# Patient Record
Sex: MALE | Race: WHITE | NOT HISPANIC OR LATINO | Employment: OTHER | URBAN - METROPOLITAN AREA
[De-identification: names, ages, dates, MRNs, and addresses within clinical notes are randomized per-mention and may not be internally consistent; named-entity substitution may affect disease eponyms.]

---

## 2017-01-19 NOTE — PROGRESS NOTES
Pt denied. Chart review after 30 day letter. Pt cancelled urology/cysto/suds and failed to reschedule or respond to 30 day letter. Pt will need urology clearance before re-referral.

## 2017-02-02 ENCOUNTER — TELEPHONE (OUTPATIENT)
Dept: TRANSPLANT | Facility: CLINIC | Age: 42
End: 2017-02-02

## 2017-02-02 NOTE — TELEPHONE ENCOUNTER
----- Message from Mazin Mcclellan sent at 1/31/2017  2:38 PM CST -----  Contact: pt  Is there for You  ----- Message -----     From: Renzo Alan     Sent: 1/31/2017   8:32 AM       To: [facility] Pre-Kidney Transplant Non-Clinical    Calling to schedule hep A and urology in [place], please call

## 2017-02-02 NOTE — TELEPHONE ENCOUNTER
"I spoke with patient and advised him that his episode was closed due to non response to 30 day letter. He stated he never got the 30 day letter. I advised him that even so the transplant process has to move in a timely fashion. I also advised him that he could be re-referred by his nephrologist and his work up could be resumed once he is approved to move through the process. He stated "so you are going to blame me, well I will call my nephrologist and go to [facility]" and he hung up.  "

## 2017-02-16 PROBLEM — R19.5 HEME POSITIVE STOOL: Status: ACTIVE | Noted: 2017-02-16

## 2017-02-20 PROBLEM — D75.1 POLYCYTHEMIA: Status: ACTIVE | Noted: 2017-02-20

## 2017-02-20 PROBLEM — R59.0 LYMPHADENOPATHY, AXILLARY: Status: ACTIVE | Noted: 2017-02-20

## 2018-01-10 PROBLEM — I25.10 CAD (CORONARY ARTERY DISEASE): Status: ACTIVE | Noted: 2018-01-10

## 2018-01-10 PROBLEM — R94.39 ABNORMAL MYOCARDIAL PERFUSION STUDY: Status: ACTIVE | Noted: 2018-01-10

## 2019-09-05 ENCOUNTER — OFFICE VISIT (OUTPATIENT)
Dept: URGENT CARE | Facility: CLINIC | Age: 44
End: 2019-09-05
Payer: MEDICARE

## 2019-09-05 VITALS
HEIGHT: 71 IN | DIASTOLIC BLOOD PRESSURE: 88 MMHG | SYSTOLIC BLOOD PRESSURE: 125 MMHG | TEMPERATURE: 97 F | WEIGHT: 242 LBS | HEART RATE: 91 BPM | BODY MASS INDEX: 33.88 KG/M2 | OXYGEN SATURATION: 97 %

## 2019-09-05 DIAGNOSIS — J02.9 ACUTE PHARYNGITIS, UNSPECIFIED ETIOLOGY: Primary | ICD-10-CM

## 2019-09-05 DIAGNOSIS — N39.0 URINARY TRACT INFECTION ASSOCIATED WITH CATHETERIZATION OF URINARY TRACT, UNSPECIFIED INDWELLING URINARY CATHETER TYPE, INITIAL ENCOUNTER: ICD-10-CM

## 2019-09-05 DIAGNOSIS — T83.511A URINARY TRACT INFECTION ASSOCIATED WITH CATHETERIZATION OF URINARY TRACT, UNSPECIFIED INDWELLING URINARY CATHETER TYPE, INITIAL ENCOUNTER: ICD-10-CM

## 2019-09-05 LAB
BILIRUB UR QL STRIP: NEGATIVE
GLUCOSE UR QL STRIP: NEGATIVE
KETONES UR QL STRIP: NEGATIVE
LEUKOCYTE ESTERASE UR QL STRIP: POSITIVE
PH, POC UA: 6.5 (ref 5–8)
POC BLOOD, URINE: NEGATIVE
POC NITRATES, URINE: NEGATIVE
PROT UR QL STRIP: POSITIVE
SP GR UR STRIP: 1.02 (ref 1–1.03)
UROBILINOGEN UR STRIP-ACNC: NORMAL (ref 0.3–2.2)

## 2019-09-05 PROCEDURE — 99203 PR OFFICE/OUTPT VISIT, NEW, LEVL III, 30-44 MIN: ICD-10-PCS | Mod: 25,S$GLB,, | Performed by: FAMILY MEDICINE

## 2019-09-05 PROCEDURE — 96372 PR INJECTION,THERAP/PROPH/DIAG2ST, IM OR SUBCUT: ICD-10-PCS | Mod: S$GLB,,, | Performed by: FAMILY MEDICINE

## 2019-09-05 PROCEDURE — 81003 POCT URINALYSIS, DIPSTICK, AUTOMATED, W/O SCOPE: ICD-10-PCS | Mod: QW,S$GLB,, | Performed by: FAMILY MEDICINE

## 2019-09-05 PROCEDURE — 96372 THER/PROPH/DIAG INJ SC/IM: CPT | Mod: S$GLB,,, | Performed by: FAMILY MEDICINE

## 2019-09-05 PROCEDURE — 81003 URINALYSIS AUTO W/O SCOPE: CPT | Mod: QW,S$GLB,, | Performed by: FAMILY MEDICINE

## 2019-09-05 PROCEDURE — 99203 OFFICE O/P NEW LOW 30 MIN: CPT | Mod: 25,S$GLB,, | Performed by: FAMILY MEDICINE

## 2019-09-05 RX ORDER — LEVOFLOXACIN 500 MG/1
500 TABLET, FILM COATED ORAL DAILY
Qty: 10 TABLET | Refills: 0 | Status: SHIPPED | OUTPATIENT
Start: 2019-09-05 | End: 2019-09-15

## 2019-09-05 RX ORDER — DEXAMETHASONE SODIUM PHOSPHATE 100 MG/10ML
5 INJECTION INTRAMUSCULAR; INTRAVENOUS
Status: COMPLETED | OUTPATIENT
Start: 2019-09-05 | End: 2019-09-05

## 2019-09-05 RX ORDER — OMEPRAZOLE 40 MG/1
40 CAPSULE, DELAYED RELEASE ORAL DAILY
COMMUNITY
End: 2019-11-05

## 2019-09-05 RX ADMIN — DEXAMETHASONE SODIUM PHOSPHATE 5 MG: 100 INJECTION INTRAMUSCULAR; INTRAVENOUS at 05:09

## 2019-09-05 NOTE — PROGRESS NOTES
"Subjective:       Patient ID: Reinier Martin is a 43 y.o. male.    Vitals:  height is 5' 11" (1.803 m) and weight is 109.8 kg (242 lb). His oral temperature is 96.6 °F (35.9 °C). His blood pressure is 125/88 and his pulse is 91. His oxygen saturation is 97%.     Chief Complaint: Sore Throat    Sore Throat    This is a new problem. The current episode started today. The problem has been gradually worsening. There has been no fever. The pain is at a severity of 2/10. The pain is mild. Associated symptoms include congestion, headaches and trouble swallowing. Pertinent negatives include no coughing, diarrhea, ear discharge, ear pain, neck pain or vomiting. He has tried nothing for the symptoms.       Constitution: Positive for sweating. Negative for chills, fatigue and fever.   HENT: Positive for congestion, postnasal drip, sore throat and trouble swallowing. Negative for ear pain, ear discharge, sinus pain, sinus pressure and voice change.    Neck: Negative for neck pain.   Eyes: Negative for eye redness.   Respiratory: Negative for chest tightness, cough and sputum production.    Gastrointestinal: Negative for nausea, vomiting and diarrhea.   Musculoskeletal: Negative for muscle ache.   Allergic/Immunologic: Positive for sneezing. Negative for seasonal allergies.   Neurological: Positive for headaches.       Objective:      Physical Exam   Constitutional: He is oriented to person, place, and time. He appears well-developed and well-nourished. He is cooperative.  Non-toxic appearance. He does not appear ill. No distress.   HENT:   Head: Normocephalic and atraumatic.   Right Ear: Hearing, tympanic membrane, external ear and ear canal normal.   Left Ear: Hearing, tympanic membrane, external ear and ear canal normal.   Nose: Nose normal. No mucosal edema, rhinorrhea or nasal deformity. No epistaxis. Right sinus exhibits no maxillary sinus tenderness and no frontal sinus tenderness. Left sinus exhibits no maxillary " sinus tenderness and no frontal sinus tenderness.   Mouth/Throat: Uvula is midline and mucous membranes are normal. No trismus in the jaw. Normal dentition. No uvula swelling. Posterior oropharyngeal edema and posterior oropharyngeal erythema present.   Eyes: Conjunctivae and lids are normal. No scleral icterus.   Neck: Trachea normal, full passive range of motion without pain and phonation normal. Neck supple.   Cardiovascular: Normal rate, regular rhythm, normal heart sounds, intact distal pulses and normal pulses.   Pulmonary/Chest: Effort normal and breath sounds normal. No respiratory distress.   Abdominal: Soft. Normal appearance and bowel sounds are normal. He exhibits no distension. There is no tenderness.   Musculoskeletal: Normal range of motion. He exhibits no edema or deformity.   Neurological: He is alert and oriented to person, place, and time. He exhibits normal muscle tone. Coordination normal.   Skin: Skin is warm, dry and intact. He is not diaphoretic. No pallor.   Psychiatric: He has a normal mood and affect. His speech is normal and behavior is normal. Judgment and thought content normal. Cognition and memory are normal.   Nursing note and vitals reviewed.      Results for orders placed or performed in visit on 09/05/19   POCT Urinalysis, Dipstick, Automated, W/O Scope   Result Value Ref Range    POC Blood, Urine Negative Negative    POC Bilirubin, Urine Negative Negative    POC Urobilinogen, Urine Normal 0.3 - 2.2    POC Ketones, Urine Negative Negative    POC Protein, Urine Positive (A) Negative    POC Nitrates, Urine Negative Negative    POC Glucose, Urine Negative Negative    pH, UA 6.5 5 - 8    POC Specific Gravity, Urine 1.020 1.003 - 1.029    POC Leukocytes, Urine Positive (A) Negative       Assessment:       1. Acute pharyngitis, unspecified etiology    2. Urinary tract infection associated with catheterization of urinary tract, unspecified indwelling urinary catheter type, initial  encounter        Plan:         Acute pharyngitis, unspecified etiology  -     dexamethasone injection 5 mg  -     POCT Urinalysis, Dipstick, Automated, W/O Scope  -     levoFLOXacin (LEVAQUIN) 500 MG tablet; Take 1 tablet (500 mg total) by mouth once daily. for 10 days  Dispense: 10 tablet; Refill: 0  -     dexchlorpheniramin-pseudoephed (RESCON) 2-60 mg Tab; Take 1 tablet by mouth 2 (two) times daily as needed.  Dispense: 20 tablet; Refill: 0    Urinary tract infection associated with catheterization of urinary tract, unspecified indwelling urinary catheter type, initial encounter    Please drink plenty of fluids.  Please get plenty of rest.  Please return here or go to the Emergency Department for any concerns or worsening of condition.  If you were given wait & see antibiotics, please wait 3-5 days before taking them, and only take them if your symptoms have worsened or not improved.  If you do begin taking the antibiotics, please take them to completion.  If you were prescribed antibiotics, please take them to completion.  If you were prescribed a narcotic medication, do not drive or operate heavy equipment or machinery while taking these medications.    You were given a decongestant (RESCON or POLY VENT Dm).  If your insurance does not cover it or you cannot afford it, it is ok to use the over the counter products listed below.  If you do not have Hypertension or any history of palpitations, it is ok to take over the counter Sudafed or Mucinex D or Allegra-D or Claritin-D or Zyrtec-D.  If you do take one of the above, it is ok to combine that with plain over the counter Mucinex or Allegra or Claritin or Zyrtec.  If for example you are taking Zyrtec -D, you can combine that with Mucinex, but not Mucinex-D.  If you are taking Mucinex-D, you can combine that with plain Allegra or Claritin or Zyrtec.   If you do have Hypertension or palpitations, it is safe to take Coricidin HBP for relief of sinus symptoms.    We  recommend you take over the counter Flonase (Fluticasone) or another nasally inhaled steroid unless you are already taking one.  Nasal irrigation with a saline spray or Netti Pot like device per their directions is also recommended.  If not allergic, please take over the counter Tylenol (Acetaminophen) and/or Motrin (Ibuprofen) as directed for control of pain and/or fever.    Robitussin DM 2 teas every 4 hours as needed for cough.  If you  smoke, please stop smoking.    You were given an injection of steroid.  This will relieve swelling and inflammation and improve respiratory symptoms.  It can raise your blood sugar if you are diabetic.    Please follow up with your primary care doctor or specialist as needed.  Kitty Tavera MD  [phone]    You must understand that you have received treatment at an Urgent Care facility only, and that you may be  released before all of your medical problems are known or treated. Urgent Care facilities are not equipped to  handle life threatening emergencies. It is recommended that you seek care at an Emergency Department for  further evaluation of worsening or concerning symptoms, or possibly life threatening conditions as  discussed.

## 2019-09-05 NOTE — LETTER
September 5, 2019  Reinier Martin  [address] [place]  [address]  [place] [address]                [facility] [place]  [address]  [place] [address]  Phone: [phone]  Fax: [phone] Reinier Martin was seen and treated in our Urgent Care   department on 9/5/2019. He may return to work in 2 - 3 days.      If you have any questions or concerns, please don't hesitate to call.    Sincerely,        Ming Shankar MD

## 2019-09-05 NOTE — PATIENT INSTRUCTIONS
Please drink plenty of fluids.  Please get plenty of rest.  Please return here or go to the Emergency Department for any concerns or worsening of condition.  If you were given wait & see antibiotics, please wait 3-5 days before taking them, and only take them if your symptoms have worsened or not improved.  If you do begin taking the antibiotics, please take them to completion.  If you were prescribed antibiotics, please take them to completion.    If you were prescribed a narcotic medication, do not drive or operate heavy equipment or machinery while taking these medications.    You were given a decongestant (RESCON or POLY VENT Dm).  If your insurance does not cover it or you cannot afford it, it is ok to use the over the counter products listed below.  If you do not have Hypertension or any history of palpitations, it is ok to take over the counter Sudafed or Mucinex D or Allegra-D or Claritin-D or Zyrtec-D.  If you do take one of the above, it is ok to combine that with plain over the counter Mucinex or Allegra or Claritin or Zyrtec.  If for example you are taking Zyrtec -D, you can combine that with Mucinex, but not Mucinex-D.  If you are taking Mucinex-D, you can combine that with plain Allegra or Claritin or Zyrtec.   If you do have Hypertension or palpitations, it is safe to take Coricidin HBP for relief of sinus symptoms.    We recommend you take over the counter Flonase (Fluticasone) or another nasally inhaled steroid unless you are already taking one.      Nasal irrigation with a saline spray or Netti Pot like device per their directions is also recommended.  If not allergic, please take over the counter Tylenol (Acetaminophen)  as directed for control of pain and/or fever.  You may take Sudafed every 6 hours as directed for congestion.    You were given an injection of steroid.  This will relieve swelling and inflammation and improve respiratory symptoms.  It can raise your blood sugar if you are  diabetic.    Please follow up with your primary care doctor or specialist as needed.    Kitty Tavera MD  [phone]    If you  smoke, please stop smoking.       You must understand that you have received treatment at an Urgent Care facility only, and that you may be  released before all of your medical problems are known or treated. Urgent Care facilities are not equipped to  handle life threatening emergencies. It is recommended that you seek care at an Emergency Department for  further evaluation of worsening or concerning symptoms, or possibly life threatening conditions as  discussed.    Pharyngitis: Strep (Presumed)    You have pharyngitis (sore throat). The cause is thought to be the streptococcus, or strep, bacterium. Strep throat infection can cause throat pain that is worse when swallowing, aching all over, headache, and fever. The infection may be spread by coughing, kissing, or touching others after touching your mouth or nose. Antibiotic medications are given to treat the infection.  Home care  · Rest at home. Drink plenty of fluids to avoid dehydration.  · No work or school for the first 2 days of taking the antibiotics. After this time, you will not be contagious. You can then return to work or school if you are feeling better.   · The antibiotic medication must be taken for the full 10 days, even if you feel better. This is very important to ensure the infection is treated. It is also important to prevent drug-resistant organisms from developing. If you were given an antibiotic shot, no more antibiotics are needed.  · You may use acetaminophen or ibuprofen to control pain or fever, unless another medicine was prescribed for this. If you have chronic liver or kidney disease or ever had a stomach ulcer or GI bleeding, talk with your doctor before using these medicines.  · Throat lozenges or a throat-numbing sprays can help reduce throat pain. Gargling with warm salt water can also help. Dissolve 1/2  teaspoon of salt in 1 8 ounce glass of warm water.   · Avoid salty or spicy foods, which can irritate the throat.  Follow-up care  Follow up with your healthcare provider or our staff if you are not improving over the next week.  When to seek medical advice  Call your healthcare provider right away if any of these occur:  · Fever as directed by your doctor.   · New or worsening ear pain, sinus pain, or headache  · Painful lumps in the back of neck  · Stiff neck  · Lymph nodes are getting larger  · Inability to swallow liquids, excessive drooling, or inability to open mouth wide due to throat pain  · Signs of dehydration (very dark urine or no urine, sunken eyes, dizziness)  · Trouble breathing or noisy breathing  · Muffled voice  · New rash  Date Last Reviewed: 4/13/2015 © 2000-2016 [employer]. [address] [place] [address]. All rights reserved. This information is not intended as a substitute for professional medical care. Always follow your healthcare professional's instructions.      Please return here or go to the Emergency Department for any concerns or worsening of condition.  If you were prescribed antibiotics, please take them to completion.  If you were prescribed a narcotic medication, do not drive or operate heavy equipment or machinery while taking these medications.  Please follow up with your primary care doctor or specialist as needed.  Please drink plenty of fluids.  Please get plenty of rest.  If you were prescribed Pyridium (phenazopyridine), please be aware that if you wear contact lens that this medication may stain your contacts.  While taking this medication it is recommended that you do not wear your contacts until 24 hours after your last dose.    Push fluids aggressively to improve symptoms and wash through the infection.  Cranberry juice can help relieve symptoms  If you  smoke, please stop smoking.    Please follow up with your primary care doctor or specialist  as needed.    Kitty Tavera MD  [phone]    You must understand that you have received treatment at an Urgent Care facility only, and that you may be  released before all of your medical problems are known or treated. Urgent Care facilities are not equipped to  handle life threatening emergencies. It is recommended that you seek care at an Emergency Department for  further evaluation of worsening or concerning symptoms, or possibly life threatening conditions as  discussed.      Bladder Infection, Male (Adult)    You have a bladder infection.  Urine is normally free of bacteria. But bacteria can get into the urinary tract from the skin around the rectum or it may travel in the blood from elsewhere in the body.  This is called a urinary tract infection (UTI). An infection can occur anywhere in the urinary tract. It could be in a kidney (pyelonephritis)or in the bladder (cystitis) and urethra (urethritis). The urethra is the tube that drains the urine from the bladder through the tip of the penis.  The most common place for a UTI is in the bladder. This is called a bladder infection. Most bladder infections are easily treated. They are not serious unless the infection spreads up to the kidney.  The terms bladder infection, UTI, and cystitis are often used to describe the same thing, but they arent always the same. Cystitis is an inflammation of the bladder. The most common cause of cystitis is an infection.   Keep in mind:  · Infections in the urine are called UTIs.  · Cystitis is usually caused by a UTI.  · Not all UTIs and cases of cystitis are bladder infections.  · Bladder infections are the most common type of cystitis.  Symptoms of a bladder infection  The infection causes inflammation in the urethra and bladder. This inflammation causes many of the symptoms. The most common symptoms of a bladder infection are:  · Pain or burning when urinating  · Having to go more often than usual  · Feeling like you need  to go right away  · Only a small amount comes out  · Blood in urine  · Discomfort in your belly (abdomen), usually in the lower abdomen, above the pubic bone  · Cloudy, strong, or bad smelling urine  · Unable to urinate (retention)  · Urinary incontinence  · Fever  · Loss of appetite  Older adults may also feel confused.  Causes of a bladder infection  Bladder infections are not contagious. You can't get one from someone else, from a toilet seat, or from sharing a bath.  The most common cause of bladder infections is bacteria from the bowels. The bacteria get onto the skin around the opening of the urethra. From there they can get into the urine and travel up to the bladder. This causes inflammation and an infection. This usually happens because of:  · An enlarged prostate  · Poor cleaning of the genitals  · Procedures that put a tube in your bladder, like a Burgos catheter  · Bowel incontinence  · Older age  · Not emptying your bladder (The urine stays there, giving the bacteria a chance to grow.)  · Dehydration (This allows urine to stay in the bladder longer.)  · Constipation (This can cause the bowels to push on the bladder or urethra and keep the bladder from emptying.)  Treatment  Bladder infections are treated with antibiotics. They usually clear up quickly without complications. Treatment helps prevent a more serious kidney infection.  Medicines  Medicines can help in the treatment of a bladder infection:  · You may have been given phenazopyridine to ease burning when you urinate. It will cause your urine to be bright orange. It can stain clothing.  · You may have been prescribed antibiotics. Take this medicine until you have finished it, even if you feel better. Taking all of the medicine will make sure the infection has cleared.  You can use acetaminophen or ibuprofen for pain, fever, or discomfort, unless another medicine was prescribed. You can also alternate them, or use both together. They work  differently and are a different class of medicines, so taking them together is not an overdose. If you have chronic liver or kidney disease, talk with your healthcare provider before using these medicines. Also talk with your provider if youve had a stomach ulcer or GI bleeding or are taking blood thinner medicines.  Home care  Here are some guidelines to help you care for yourself at home:  · Drink plenty of fluids, unless your healthcare provider told you not to. Fluids will prevent dehydration and flush out your bladder.  · Use good personal hygiene. Wipe from front to back after using the toilet, and clean your penis regularly. If you arent circumcised, retract the foreskin when cleaning.  · Urinate more frequently, and dont try to hold it in for long periods of time, if possible.  · Wear loose-fitting clothes and cotton underwear. Avoid tight-fitting pants. This helps keep you clean and dry.  · Change your diet to prevent constipation. This means eating more fresh foods and more fiber, and less junk and fatty foods.  · Avoid sex until your symptoms are gone.  · Avoid caffeine, alcohol, and spicy foods. These can irritate the bladder.  Follow-up care  Follow up with your healthcare provider, or as advised if all symptoms have not cleared up within 5 days. It is important to keep your follow-up appointment. You can talk with your provider to see if you need more tests of the urinary tract. This is especially important if you have infections that keep coming back.  If a culture was done, you will be told if your treatment needs to be changed. If directed, you can call to find out the results.  If X-rays were taken, you will be told of any findings that may affect your care.  Call 911  Call 911 if any of these occur:  · Trouble breathing  · Difficulty waking up  · Feeling confused  · Fainting or loss of consciousness  · Rapid heart rate  When to seek medical advice  Call your healthcare provider right away if  any of these occur:  · Fever of 100.4ºF (38ºC) or higher, or as directed by your healthcare provider  · Your symptoms dont improve after 2 days of treatment  · Back or abdominal pain that gets worse  · Repeated vomiting, or you arent able to keep medicine down  · Weakness or dizziness  Date Last Reviewed: 10/1/2016  © 0303-0086 [employer]. [address] [place] [address], [place] [address]. All rights reserved. This information is not intended as a substitute for professional medical care. Always follow your healthcare professional's instructions.

## 2019-11-06 PROBLEM — I20.9 ANGINA, CLASS III: Status: ACTIVE | Noted: 2019-11-06

## 2020-05-15 PROBLEM — K21.9 GASTROESOPHAGEAL REFLUX DISEASE: Status: ACTIVE | Noted: 2020-05-15

## 2020-10-01 ENCOUNTER — TELEPHONE (OUTPATIENT)
Dept: GASTROENTEROLOGY | Facility: CLINIC | Age: 45
End: 2020-10-01

## 2020-10-01 NOTE — TELEPHONE ENCOUNTER
Called pt and scheduled NP appointment for 10/5/20 at 10 am. Discussed with pt that Dr. Peraza is a consultant who will send them back to their general GI provider, , once their symptoms improved and plan of care is established. Pt was told the logistics of the appointment (arrival time, length of visit,  and total time spent at facility). Pt was also told that Dr. Peraza will review their previous workup but may order additional test and perform her own procedures and that this may require an overnight stay at a local hotel to complete the workup.

## 2020-10-05 ENCOUNTER — TELEPHONE (OUTPATIENT)
Dept: GASTROENTEROLOGY | Facility: CLINIC | Age: 45
End: 2020-10-05

## 2020-10-05 ENCOUNTER — OFFICE VISIT (OUTPATIENT)
Dept: GASTROENTEROLOGY | Facility: CLINIC | Age: 45
End: 2020-10-05
Payer: MEDICARE

## 2020-10-05 VITALS
SYSTOLIC BLOOD PRESSURE: 163 MMHG | HEART RATE: 56 BPM | WEIGHT: 271.19 LBS | BODY MASS INDEX: 37.97 KG/M2 | DIASTOLIC BLOOD PRESSURE: 111 MMHG | HEIGHT: 71 IN

## 2020-10-05 DIAGNOSIS — K21.9 GASTROESOPHAGEAL REFLUX DISEASE, UNSPECIFIED WHETHER ESOPHAGITIS PRESENT: Primary | ICD-10-CM

## 2020-10-05 DIAGNOSIS — R07.89 NON-CARDIAC CHEST PAIN: ICD-10-CM

## 2020-10-05 PROCEDURE — 99204 OFFICE O/P NEW MOD 45 MIN: CPT | Mod: S$PBB,,, | Performed by: INTERNAL MEDICINE

## 2020-10-05 PROCEDURE — 99999 PR PBB SHADOW E&M-EST. PATIENT-LVL IV: CPT | Mod: PBBFAC,,, | Performed by: INTERNAL MEDICINE

## 2020-10-05 PROCEDURE — 99214 OFFICE O/P EST MOD 30 MIN: CPT | Mod: PBBFAC | Performed by: INTERNAL MEDICINE

## 2020-10-05 PROCEDURE — 99999 PR PBB SHADOW E&M-EST. PATIENT-LVL IV: ICD-10-PCS | Mod: PBBFAC,,, | Performed by: INTERNAL MEDICINE

## 2020-10-05 PROCEDURE — 99204 PR OFFICE/OUTPT VISIT, NEW, LEVL IV, 45-59 MIN: ICD-10-PCS | Mod: S$PBB,,, | Performed by: INTERNAL MEDICINE

## 2020-10-05 RX ORDER — ERGOCALCIFEROL 1.25 MG/1
50000 CAPSULE ORAL
COMMUNITY

## 2020-10-05 RX ORDER — TOLNAFTATE 1% 1 G/100ML
LIQUID TOPICAL
COMMUNITY

## 2020-10-05 NOTE — PROGRESS NOTES
[facility] Consultation Note    Reason for Consult:    Chief Complaint   Patient presents with    Heartburn    Chest Pain         PCP:   Kitty Tavera   [address] / [place] [address]    Referring MD:  Peter Pino Md  [address]  [address]  [place] [address]      HPI:  Reinier Martin is a 44 y.o. male referred to motility clinic for second opinion regarding the following problems:    REF: 44-year-old man with esophageal dysmotility.  Having chest and throat episodes despite omeprazole 40 mg.       GERD.   Retrosternal pyrosis: rare w PPI BID  Regurgitation: few per year w PPI BID    Onset: years ago   Improve with: PPI   Upright symptoms: sometimes  Nocturnal symptoms:  mostly  Hoarseness: all the time  Cough: no  Throat clearing: no  Caffeine intake: none  Sleeps with head of the bed elevated. w 2 pillow  Avoids eating prior to bedtime.  Tries to eat something w meds before bedtime    MEDS:   Previoulsy on prilosec and zantac w good contol    prilosec 40mg BID  Gets worse off ppi     Chest pain in the past   Discomfort in mid chest   Not related to swallowing   Not related to activity   Onset: > year ago. Resolved a year ago   Cardiology does not think this was cardiac     Episodes of throat discomfort/migrain/shoudler pain    Completely spontaneous episodes of   Throat feels cold, get migraine HA, bottom teeth start to hurt, pain in shoulder radiating down R arm  Last up to 30 sec - minutes   Feels like a wave of epsiodes.  These episodes lasts seconds but repeat for minutes.  Gets up to 10 episodes per day, than no sx for few weeks  Few episodes per month   Onset: >1 year ago, 1 week after increased Lipitor.  Sx did not get better when he stopped lipitor for 2 weeks    Not related to eating  Not related to activity  Not related to stress   Never occurs during sleep   Has ho CAD/recent cardiology moseley negative     Denies dysphagia, nausea, vomiting, early  satiety, abdominal pain, bloating, diarrhea, constipation, BRBPR, melena, weight loss.    Previous Studies:   EGD 05/15/2020:  Small hiatal hernia.  Gastritis.  Normal duodenum.  CT abdomen pelvis without contrast 09/27/2017:  Bilateral renal cysts.  Small to moderate ascites.  Peritoneal dialysis tip and pelvis.  Umbilical hernia containing fat measuring 1.6 cm.  Colonoscopy 02/16/2017:  Good prep to TI.  Normal terminal ileum.  Nonbleeding internal hemorrhoids.  Otherwise normal.    Relevant Surgical History:    NA    ROS:  ROS   Constitutional: No fevers, no chills, no night sweats, no weight loss  ENT: No congestion, no rhinorrhea, no chronic sinus problems  CV: No chest pain, no palpitations  Pulm: No cough, no shortness of breath  Ophtho: No blurry vision, no eye redness  GI: see HPI  Derm: No rash  Heme: No lymphadenopathy, no bruising  MSK: No arthritis, no joint swelling, no Raynauds  : No dysuria, no frequent urination, no blood in urine  Endo: No hot or cold intolerance  Neuro: No dizziness, no syncope, no seizure  Psych: No anxiety, no depression      Medical History:   Past Medical History:   Diagnosis Date    Allergy     allergic to codeine    Aortic stenosis, moderate     AV fistula left arm    Coronary artery disease     End stage renal disease     Fever     recently-x two weeks ago following wisdom teeth extraction--treated with antibiotic-finished antibx treatment and fever gone now per patient    Gastritis     Gastroparesis     GERD (gastroesophageal reflux disease)     Graves disease     Heart murmur     at birth per patient    Heart murmur     History of placement of stent in LAD coronary artery     2015    Hx of migraine headaches     none since removal of wisdom teeth per patient    Hyperlipidemia     Hypertension     140s/80s    Internal hemorrhoids     Ischemic cardiomyopathy     with EF 45-50%    Kidney disease, chronic, stage IV (GFR 15-29 ml/min)     renal cysts/renal  insufficiency    Limb alert care status     LEFT    MI (mitral incompetence)     Myocardial infarction     2015    Nocturia     Peritoneal dialysis status     Twice a day, 7 days a week    Pleural effusion     Pneumonia     2015    Pneumothorax     Shingles     Sleep disturbance     Thyroid disease     hyperthyroid taking Methimazole    Ureteral reflux     Urinary retention     self catherization intermittent    UTI (urinary tract infection)         Surgical History:   Past Surgical History:   Procedure Laterality Date    AV FISTULA PLACEMENT Left     arm    CARDIAC CATHETERIZATION      CARPAL TUNNEL RELEASE      COLONOSCOPY N/A 2/16/2017    Procedure: COLONOSCOPY;  Surgeon: Peter Pino MD;  [facility];  Service: Endoscopy;  Laterality: N/A;    CORONARY ANGIOPLASTY WITH STENT PLACEMENT  03/25/2015    left heart cath-drug eluding stent    CYSTOSCOPY      ESOPHAGOGASTRODUODENOSCOPY N/A 5/15/2020    Procedure: ESOPHAGOGASTRODUODENOSCOPY (EGD);  Surgeon: Peter Pino MD;  [facility];  Service: Endoscopy;  Laterality: N/A;  BMP ON ADMIT    fatty tissue removal Left     axilla    hemodialysis      chest catheter    LAPAROSCOPIC INSERTION OF PERITONEAL DIALYSIS CATHETER      LEFT HEART CATHETERIZATION Left 11/6/2019    Procedure: Left heart cath;  Surgeon: Uriah Morton MD;  [facility];  Service: Cardiology;  Laterality: Left;    PROSTATE SURGERY      by DR LIND    UPPER GASTROINTESTINAL ENDOSCOPY      URETHRA SURGERY      VASECTOMY      wisdom teeth extracted          Family History:   Family History   Problem Relation Age of Onset    Hypertension Mother     Hypertension Father     Diabetes Father     No Known Problems Brother     Suicide Brother     No Known Problems Sister     Anesthesia problems Neg Hx     Colon cancer Neg Hx     Colon polyps Neg Hx     Crohn's disease Neg Hx     Cystic fibrosis Neg Hx     Hemochromatosis Neg Hx      Inflammatory bowel disease Neg Hx     Esophageal cancer Neg Hx     Celiac disease Neg Hx     Cirrhosis Neg Hx     Rectal cancer Neg Hx     Liver cancer Neg Hx     Liver disease Neg Hx     Joselito's disease Neg Hx     Ulcerative colitis Neg Hx     Rheum arthritis Neg Hx     Scleroderma Neg Hx     Tuberculosis Neg Hx     Lymphoma Neg Hx     Stomach cancer Neg Hx     Psoriasis Neg Hx     Lupus Neg Hx     Melanoma Neg Hx     Multiple sclerosis Neg Hx     Skin cancer Neg Hx         Social History:   Social History     Socioeconomic History    Marital status: Single     Spouse name: Not on file    Number of children: Not on file    Years of education: Not on file    Highest education level: Not on file   Occupational History    Not on file   Social Needs    Financial resource strain: Not on file    Food insecurity     Worry: Not on file     Inability: Not on file    Transportation needs     Medical: Not on file     Non-medical: Not on file   Tobacco Use    Smoking status: Former Smoker     Packs/day: 0.25     Years: 20.00     Pack years: 5.00     Quit date:      Years since quittin.7    Smokeless tobacco: Former User     Quit date: 2015    Tobacco comment: smokes only about x2 cigs./week   Substance and Sexual Activity    Alcohol use: No    Drug use: No    Sexual activity: Not Currently   Lifestyle    Physical activity     Days per week: Not on file     Minutes per session: Not on file    Stress: Not on file   Relationships    Social connections     Talks on phone: Not on file     Gets together: Not on file     Attends Jewish service: Not on file     Active member of club or organization: Not on file     Attends meetings of clubs or organizations: Not on file     Relationship status: Not on file   Other Topics Concern    Not on file   Social History Narrative    Not on file        Review of patient's allergies indicates:   Allergen Reactions    Adhesive Other (See  "Comments)     No paper tape    Codeine Nausea And Vomiting and Rash       Current Outpatient Medications   Medication Sig Dispense Refill    acebutoloL (SECTRAL) 200 MG capsule Take 200 mg by mouth once daily.      aspirin (ECOTRIN) 81 MG EC tablet Take 81 mg by mouth.      atorvastatin (LIPITOR) 40 MG tablet Take 20 mg by mouth once daily.      ergocalciferol (ERGOCALCIFEROL) 50,000 unit Cap Take 50,000 Units by mouth.      fluticasone (FLONASE) 50 mcg/actuation nasal spray 1 spray by Nasal route 2 (two) times daily.       furosemide (LASIX) 80 MG tablet Take 80 mg by mouth as needed. Take 40mg - 80mg daily as needed      gentamicin (GARAMYCIN) 0.1 % cream Apply topically as needed.      lisinopril 10 MG tablet Take 5 mg by mouth once daily.       omeprazole (PRILOSEC) 40 MG capsule Take 40 mg by mouth 2 (two) times daily.       sodium bicarbonate 650 MG tablet Take 650 mg by mouth 3 (three) times daily. 3 TABLETS      tamsulosin (FLOMAX) 0.4 mg Cp24 Take 0.4 mg by mouth once daily.      temazepam (RESTORIL) 7.5 MG Cap Take 15 mg by mouth nightly as needed.      tolnaftate (TINACTIN) 1 % external solution tolnaftate      terbinafine HCL (LAMISIL) 250 mg tablet Take 250 mg by mouth once daily.       No current facility-administered medications for this visit.         Objective Findings:  Vital Signs:  BP (!) 163/111   Pulse (!) 56   Ht 5' 11" (1.803 m)   Wt 123 kg (271 lb 2.7 oz)   BMI 37.82 kg/m²   Body mass index is 37.82 kg/m².    Physical Exam:  General appearance: alert, cooperative, no distress, obese  HENT: Normocephalic, atraumatic, neck symmetrical, no nasal discharge  Eyes: conjunctivae/corneas clear, PERRL, EOM's intact  Lungs: clear to auscultation bilaterally, no dullness to percussion bilaterally  Heart: regular rate and rhythm without rub; no displacement of the PMI  Abdomen: soft, non-tender; bowel sounds normoactive; no organomegaly  Extremities: extremities symmetric; no " clubbing, cyanosis, or edema  Integument: Skin color, texture, turgor normal; no rashes; hair distrubution normal  Neurologic: Alert and oriented X 3, normal strength, normal coordination and gait  Psychiatric: no pressured speech; normal affect; no evidence of impaired cognition    Labs:   Reviewed in Epic      Assessment and Plan:  Reinier Martin is a 44 y.o. male with obesity, chronic kidney disease on peritoneal dialysis, coronary artery disease with stents on aspirin, enlarged prostate, urinary retention, hypertension, heart murmur, pleural effusion and pneumothorax, hyperlipidemia, thyroid disease, aortic stenosis, hemorrhoids referred to motility Clinic for 2nd opinion regarding the following problems:    Episodes of throat discomfort w migraine and jaw/shoudler pain .  Unlikely that this is an esophageal problem. Low probability that related to GERD.   Encouraged pt to see a neurologist to evaluate/treat as recommended by PCP.   PT will try to see a local neurologist.    Will call me back if needs ref to neuro at [facility]     GERD. Pyrosis and regurgitation.  Nocturnal symptoms.  Occasional atypical symptoms  Previoulsy on prilosec and zantac w good contol    Well controlled w prilosec 40mg BID. Gets worse off ppi   We discussed the the long-term consequences of chronic PPI   -Check EGD w  EoE BRAVO off PPI to assess severity of reflux/need for ppi    -Would benefit from weight loss/will discuss at next vist     Chest pain in the past. Not related to swallowing.  Denies dysphagia   Not related to activity. Ho CAD/stents. Cardiology does not think this was cardiac   Resolved a year ago     Gastroparesis diagnosis in chart.  Denies nausea, vomiting, satiety.    CRC screening.  C-scope negative 2017.  No fam hx CRC  -Repeat in 2027 w  primary GI     Follow up in about 3 months (around 1/5/2021).    1. Gastroesophageal reflux disease, unspecified whether esophagitis present    2. Non-cardiac chest pain           Order summary:  Orders Placed This Encounter    Case request GI: ESOPHAGOGASTRODUODENOSCOPY (EGD) with BRAVO         Thank you so much for allowing me to participate in the care of Reinier Peraza MD

## 2020-10-05 NOTE — TELEPHONE ENCOUNTER
MOTILITY CLINIC PROCEDURE ORDERS    CLEARANCE FOR PROCEDURES:  No needed     PROCEDURES  EGD with BRAVO 48 hours     FLOOR:  4th Floor       PREP  Standard Prep      MEDICATIONS  OFF PPI/H2 Blocker       ORDER OF TESTING:  PT wants to stay at [facility] after EGD bc unable to get a ride

## 2020-10-05 NOTE — LETTER
October 5, 2020      Peter Pino MD  [address]  [address]  [place] [address]           [facility]  [address]  [facility] [address]  Phone: [phone]  Fax: [phone]          Patient: Reinier Martin   MR Number: [id number]   YOB: 1975   Date of Visit: 10/5/2020       Dear Dr. Peter Pino:    Thank you for referring Reinier Martin to me for evaluation. Attached you will find relevant portions of my assessment and plan of care.    If you have questions, please do not hesitate to call me. I look forward to following Reinier Martin along with you.    Sincerely,    Gloria Peraza MD    Enclosure  CC:  No Recipients    If you would like to receive this communication electronically, please contact [email or web address] or [phone] to request more information on [employer] Link access.    For providers and/or their staff who would like to refer a patient to [facility], please contact us through our one-stop-shop provider referral line, [facility] [place], at [phone].    If you feel you have received this communication in error or would no longer like to receive these types of communications, please e-mail [email or web address]

## 2020-10-05 NOTE — PATIENT INSTRUCTIONS
-See a neurologist locally to evaluate your throat discomfort and jaw/shoulder pain.  If you do not get a good explanation, we can arrange an appointment with headache specialist at [facility].      -complete EGD with BRAVO to assess severity of your acid reflux

## 2020-10-19 ENCOUNTER — TELEPHONE (OUTPATIENT)
Dept: ENDOSCOPY | Facility: HOSPITAL | Age: 45
End: 2020-10-19

## 2020-10-19 NOTE — TELEPHONE ENCOUNTER
Cardiology records requested from Dr. Uriah Morton office at [facility]  Prior to scheduling his EGD. Fax [phone]    Patient aware will contact once received to finish scheduling procedures.

## 2020-10-23 ENCOUNTER — PATIENT MESSAGE (OUTPATIENT)
Dept: GASTROENTEROLOGY | Facility: CLINIC | Age: 45
End: 2020-10-23

## 2020-11-04 ENCOUNTER — TELEPHONE (OUTPATIENT)
Dept: ENDOSCOPY | Facility: HOSPITAL | Age: 45
End: 2020-11-04

## 2020-11-04 NOTE — TELEPHONE ENCOUNTER
Called patient regarding procedures with Dr. Peraza.  No answer and message was left with direct number to call back to schedule.

## 2020-11-06 ENCOUNTER — TELEPHONE (OUTPATIENT)
Dept: ENDOSCOPY | Facility: HOSPITAL | Age: 45
End: 2020-11-06

## 2020-11-06 NOTE — TELEPHONE ENCOUNTER
Patient states he is not paying for someone to bring him to and from procedures. We do not normally allow patients to be discharge to the [facility].    I explained to him that someone can drop him off and then someone else can pick him up but he would need someone to be able to pick him up. He refuses. I said I would send a message to you to see how you want to proceed.

## 2020-11-09 DIAGNOSIS — Z99.2 DEPENDENCE ON PERITONEAL DIALYSIS: Primary | ICD-10-CM

## 2020-11-09 DIAGNOSIS — Z01.818 PRE-OP TESTING: Primary | ICD-10-CM

## 2020-11-10 ENCOUNTER — TELEPHONE (OUTPATIENT)
Dept: ENDOSCOPY | Facility: HOSPITAL | Age: 45
End: 2020-11-10

## 2020-12-09 ENCOUNTER — ANESTHESIA EVENT (OUTPATIENT)
Dept: ENDOSCOPY | Facility: HOSPITAL | Age: 45
End: 2020-12-09
Payer: MEDICARE

## 2020-12-10 ENCOUNTER — ANESTHESIA (OUTPATIENT)
Dept: ENDOSCOPY | Facility: HOSPITAL | Age: 45
End: 2020-12-10
Payer: MEDICARE

## 2020-12-10 ENCOUNTER — HOSPITAL ENCOUNTER (OUTPATIENT)
Facility: HOSPITAL | Age: 45
Discharge: HOME | End: 2020-12-10
Attending: INTERNAL MEDICINE | Admitting: INTERNAL MEDICINE
Payer: MEDICARE

## 2020-12-10 VITALS
TEMPERATURE: 98 F | HEART RATE: 61 BPM | HEIGHT: 71 IN | SYSTOLIC BLOOD PRESSURE: 105 MMHG | WEIGHT: 260.13 LBS | DIASTOLIC BLOOD PRESSURE: 58 MMHG | OXYGEN SATURATION: 97 % | BODY MASS INDEX: 36.42 KG/M2 | RESPIRATION RATE: 18 BRPM

## 2020-12-10 DIAGNOSIS — K21.9 GASTROESOPHAGEAL REFLUX DISEASE, UNSPECIFIED WHETHER ESOPHAGITIS PRESENT: Primary | ICD-10-CM

## 2020-12-10 DIAGNOSIS — K21.9 GERD (GASTROESOPHAGEAL REFLUX DISEASE): ICD-10-CM

## 2020-12-10 PROCEDURE — 43239 PR EGD, FLEX, W/BIOPSY, SGL/MULTI: ICD-10-PCS | Mod: ,,, | Performed by: INTERNAL MEDICINE

## 2020-12-10 PROCEDURE — 37000009 HC ANESTHESIA EA ADD 15 MINS: Performed by: INTERNAL MEDICINE

## 2020-12-10 PROCEDURE — 88312 PR  SPECIAL STAINS,GROUP I: ICD-10-PCS | Mod: 26,,, | Performed by: PATHOLOGY

## 2020-12-10 PROCEDURE — 82941 ASSAY OF GASTRIN: CPT

## 2020-12-10 PROCEDURE — 63600175 PHARM REV CODE 636 W HCPCS: Performed by: NURSE ANESTHETIST, CERTIFIED REGISTERED

## 2020-12-10 PROCEDURE — E9220 PRA ENDO ANESTHESIA: HCPCS | Mod: ,,, | Performed by: NURSE ANESTHETIST, CERTIFIED REGISTERED

## 2020-12-10 PROCEDURE — 25000003 PHARM REV CODE 250: Performed by: INTERNAL MEDICINE

## 2020-12-10 PROCEDURE — 88312 SPECIAL STAINS GROUP 1: CPT | Performed by: PATHOLOGY

## 2020-12-10 PROCEDURE — 43239 EGD BIOPSY SINGLE/MULTIPLE: CPT | Mod: ,,, | Performed by: INTERNAL MEDICINE

## 2020-12-10 PROCEDURE — 88305 TISSUE EXAM BY PATHOLOGIST: ICD-10-PCS | Mod: 26,,, | Performed by: PATHOLOGY

## 2020-12-10 PROCEDURE — 88341 IMHCHEM/IMCYTCHM EA ADD ANTB: CPT | Mod: 26,,, | Performed by: PATHOLOGY

## 2020-12-10 PROCEDURE — 25000003 PHARM REV CODE 250: Performed by: NURSE ANESTHETIST, CERTIFIED REGISTERED

## 2020-12-10 PROCEDURE — 88341 PR IHC OR ICC EACH ADD'L SINGLE ANTIBODY  STAINPR: ICD-10-PCS | Mod: 26,,, | Performed by: PATHOLOGY

## 2020-12-10 PROCEDURE — 88305 TISSUE EXAM BY PATHOLOGIST: CPT | Performed by: PATHOLOGY

## 2020-12-10 PROCEDURE — 43239 EGD BIOPSY SINGLE/MULTIPLE: CPT | Performed by: INTERNAL MEDICINE

## 2020-12-10 PROCEDURE — 88342 IMHCHEM/IMCYTCHM 1ST ANTB: CPT | Mod: 26,,, | Performed by: PATHOLOGY

## 2020-12-10 PROCEDURE — 88341 IMHCHEM/IMCYTCHM EA ADD ANTB: CPT | Performed by: PATHOLOGY

## 2020-12-10 PROCEDURE — 88305 TISSUE EXAM BY PATHOLOGIST: CPT | Mod: 26,,, | Performed by: PATHOLOGY

## 2020-12-10 PROCEDURE — 88342 IMHCHEM/IMCYTCHM 1ST ANTB: CPT | Mod: 59 | Performed by: PATHOLOGY

## 2020-12-10 PROCEDURE — 27201012 HC FORCEPS, HOT/COLD, DISP: Performed by: INTERNAL MEDICINE

## 2020-12-10 PROCEDURE — 37000008 HC ANESTHESIA 1ST 15 MINUTES: Performed by: INTERNAL MEDICINE

## 2020-12-10 PROCEDURE — 88312 SPECIAL STAINS GROUP 1: CPT | Mod: 26,,, | Performed by: PATHOLOGY

## 2020-12-10 PROCEDURE — E9220 PRA ENDO ANESTHESIA: ICD-10-PCS | Mod: ,,, | Performed by: NURSE ANESTHETIST, CERTIFIED REGISTERED

## 2020-12-10 PROCEDURE — 88342 CHG IMMUNOCYTOCHEMISTRY: ICD-10-PCS | Mod: 26,,, | Performed by: PATHOLOGY

## 2020-12-10 RX ORDER — PANTOPRAZOLE SODIUM 40 MG/1
40 TABLET, DELAYED RELEASE ORAL ONCE
Status: COMPLETED | OUTPATIENT
Start: 2020-12-10 | End: 2020-12-10

## 2020-12-10 RX ORDER — CIPROFLOXACIN 500 MG/1
500 TABLET ORAL DAILY
COMMUNITY

## 2020-12-10 RX ORDER — PROPOFOL 10 MG/ML
VIAL (ML) INTRAVENOUS
Status: DISCONTINUED | OUTPATIENT
Start: 2020-12-10 | End: 2020-12-10

## 2020-12-10 RX ORDER — SODIUM CHLORIDE 9 MG/ML
INJECTION, SOLUTION INTRAVENOUS CONTINUOUS
Status: DISCONTINUED | OUTPATIENT
Start: 2020-12-10 | End: 2020-12-10 | Stop reason: HOSPADM

## 2020-12-10 RX ORDER — SODIUM CHLORIDE 0.9 % (FLUSH) 0.9 %
10 SYRINGE (ML) INJECTION
Status: DISCONTINUED | OUTPATIENT
Start: 2020-12-10 | End: 2020-12-10 | Stop reason: HOSPADM

## 2020-12-10 RX ORDER — PROPOFOL 10 MG/ML
VIAL (ML) INTRAVENOUS CONTINUOUS PRN
Status: DISCONTINUED | OUTPATIENT
Start: 2020-12-10 | End: 2020-12-10

## 2020-12-10 RX ORDER — LIDOCAINE HYDROCHLORIDE 20 MG/ML
INJECTION, SOLUTION EPIDURAL; INFILTRATION; INTRACAUDAL; PERINEURAL
Status: DISCONTINUED | OUTPATIENT
Start: 2020-12-10 | End: 2020-12-10

## 2020-12-10 RX ADMIN — PROPOFOL 200 MCG/KG/MIN: 10 INJECTION, EMULSION INTRAVENOUS at 10:12

## 2020-12-10 RX ADMIN — SODIUM CHLORIDE: 0.9 INJECTION, SOLUTION INTRAVENOUS at 09:12

## 2020-12-10 RX ADMIN — PROPOFOL 50 MG: 10 INJECTION, EMULSION INTRAVENOUS at 10:12

## 2020-12-10 RX ADMIN — PROPOFOL 30 MG: 10 INJECTION, EMULSION INTRAVENOUS at 10:12

## 2020-12-10 RX ADMIN — PANTOPRAZOLE SODIUM 40 MG: 40 TABLET, DELAYED RELEASE ORAL at 11:12

## 2020-12-10 RX ADMIN — LIDOCAINE HYDROCHLORIDE 100 MG: 20 INJECTION, SOLUTION EPIDURAL; INFILTRATION; INTRACAUDAL at 10:12

## 2020-12-10 NOTE — ANESTHESIA POSTPROCEDURE EVALUATION
Anesthesia Post Evaluation    Patient: Reinier Martin    Procedure(s) Performed: Procedure(s) (LRB):  ESOPHAGOGASTRODUODENOSCOPY (EGD) with BRAVO (N/A)  PH MONITORING, ESOPHAGUS, WIRELESS, (OFF REFLUX MEDS) (N/A)    Final Anesthesia Type: general    Patient location during evaluation: GI PACU  Patient participation: Yes- Able to Participate  Level of consciousness: awake and alert  Post-procedure vital signs: reviewed and stable  Pain management: adequate  Airway patency: patent    PONV status at discharge: No PONV  Anesthetic complications: no      Cardiovascular status: blood pressure returned to baseline and stable  Respiratory status: unassisted, spontaneous ventilation and room air  Hydration status: euvolemic  Follow-up not needed.          Vitals Value Taken Time   /58 12/10/20 1125   Temp 36.8 °C (98.2 °F) 12/10/20 1052   Pulse 61 12/10/20 1125   Resp 18 12/10/20 1125   SpO2 97 % 12/10/20 1125         Event Time   Out of Recovery 11:39:23         Pain/Atilio Score: Atilio Score: 10 (12/10/2020 11:38 AM)

## 2020-12-10 NOTE — ANESTHESIA PREPROCEDURE EVALUATION
12/10/2020  Reinier Martin is a 45 y.o., male.    Pre-op Assessment    I have reviewed the Patient Summary Reports.       I have reviewed the Medications.     Review of Systems  Anesthesia Hx:  No problems with previous Anesthesia    Social:  Former Smoker    Cardiovascular:   Hypertension Valvular problems/Murmurs, AS Past MI CAD   Angina Ischemic CMO Coronary Artery Disease:  Coronary Angiography ([facility]).  Coronary Angiogram Findings , left anterior descending, stenosis. S/P Percutaneous Coronary Intervention (PCI) coronary stent, unknown type, left anterior descending Hx of Myocardial Infarction  Carotoid Artery Disease  Hypertension  Disorder of Cardiac Conduction    Renal/:   Chronic Renal Disease, ESRD, Dialysis BPH Neurogenic bladder Kidney Function/Disease  Dialysis Dependent, peritoneal dialysis    Hepatic/GI:   GERD        Physical Exam  General:  Well nourished    Airway/Jaw/Neck:  Airway Findings: Mouth Opening: Normal Tongue: Normal  General Airway Assessment: Adult  Mallampati: II  TM Distance: Normal, at least 6 cm       Chest/Lungs:  Chest/Lungs Findings: Clear to auscultation, Normal Respiratory Rate     Heart/Vascular:  Heart Findings: Rate: Normal  Rhythm: Regular Rhythm  Sounds: Normal             Anesthesia Plan  Type of Anesthesia, risks & benefits discussed:  Anesthesia Type:  general  Patient's Preference: ga  Intra-op Monitoring Plan: standard ASA monitors  Intra-op Monitoring Plan Comments:   Post Op Pain Control Plan: per primary service following discharge from PACU  Post Op Pain Control Plan Comments:   Induction:   IV  Beta Blocker:  Patient is on a Beta-Blocker and has received one dose within the past 24 hours (No further documentation required).       Informed Consent: Patient understands risks and agrees with Anesthesia plan.  Questions answered. Anesthesia consent  signed with patient.  ASA Score: 3     Day of Surgery Review of History & Physical:    H&P update referred to the provider.         Ready For Surgery From Anesthesia Perspective.

## 2020-12-10 NOTE — [employer] PATIENT INSTRUCTIONS
Discharge Summary/Instructions after an Endoscopic Procedure  Patient Name: Reinier Martin  Patient MRN: [id number]  Patient YOB: 1975  Thursday, December 10, 2020  Gloria Peraza MD  RESTRICTIONS:  During your procedure today, you received medications for sedation.  These   medications may affect your judgment, balance and coordination.  Therefore,   for 24 hours, you have the following restrictions:   - DO NOT drive a car, operate machinery, make legal/financial decisions,   sign important papers or drink alcohol.    ACTIVITY:  Today: no heavy lifting, straining or running due to procedural   sedation/anesthesia.  The following day: return to full activity including work.  DIET:  Eat and drink normally unless instructed otherwise.     TREATMENT FOR COMMON SIDE EFFECTS:  - Mild abdominal pain, nausea, belching, bloating or excessive gas:  rest,   eat lightly and use a heating pad.  - Sore Throat: treat with throat lozenges and/or gargle with warm salt   water.  - Because air was used during the procedure, expelling large amounts of air   from your rectum or belching is normal.  - If a bowel prep was taken, you may not have a bowel movement for 1-3 days.    This is normal.  SYMPTOMS TO WATCH FOR AND REPORT TO YOUR PHYSICIAN:  1. Abdominal pain or bloating, other than gas cramps.  2. Chest pain.  3. Back pain.  4. Signs of infection such as: chills or fever occurring within 24 hours   after the procedure.  5. Rectal bleeding, which would show as bright red, maroon, or black stools.   (A tablespoon of blood from the rectum is not serious, especially if   hemorrhoids are present.)  6. Vomiting.  7. Weakness or dizziness.  GO DIRECTLY TO THE NEAREST EMERGENCY ROOM IF YOU HAVE ANY OF THE FOLLOWING:      Difficulty breathing              Chills and/or fever over 101 F   Persistent vomiting and/or vomiting blood   Severe abdominal pain   Severe chest pain   Black, tarry stools   Bleeding- more than one  tablespoon   Any other symptom or condition that you feel may need urgent attention  Your doctor recommends these additional instructions:  If any biopsies were taken, your doctors clinic will contact you in 1 to 2   weeks with any results.  - Await pathology results.   - Discharge patient to home (with escort).   - Resume previous diet.   - Continue present medications.   - Use Prilosec (omeprazole) 40 mg PO BID today.   - Gastrin level collected during EGD.  For questions, problems or results please call your physician - Gloria Peraza MD at Work:  [phone].  [facility], EMERGENCY ROOM PHONE NUMBER: [phone]  IF A COMPLICATION OR EMERGENCY SITUATION ARISES AND YOU ARE UNABLE TO REACH   YOUR PHYSICIAN - GO DIRECTLY TO THE EMERGENCY ROOM.  Gloria Peraza MD  12/10/2020 10:47:02 AM  This report has been verified and signed electronically.  [employer]

## 2020-12-10 NOTE — DISCHARGE INSTRUCTIONS

## 2020-12-10 NOTE — H&P
Short Stay Endoscopy History and Physical    PCP - Kitty Tavera MD     Procedure - EGD  ASA - per anesthesia  Mallampati - per anesthesia  History of Anesthesia problems - no  Family history Anesthesia problems -  no   Plan of anesthesia - General    HPI:  This is a 45 y.o. male here for evaluation of gerd     ROS:  Constitutional: No fevers, chills, No weight loss  CV: No chest pain  Pulm: No cough, No shortness of breath  Ophtho: No vision changes  GI: see HPI  Derm: No rash    Medical History:  has a past medical history of Allergy, Aortic stenosis, moderate, AV fistula (left arm), Coronary artery disease, End stage renal disease, Fever, Gastritis, Gastroparesis, GERD (gastroesophageal reflux disease), Graves disease, Heart murmur, Heart murmur, History of placement of stent in LAD coronary artery, migraine headaches, Hyperlipidemia, Hypertension, Internal hemorrhoids, Ischemic cardiomyopathy, Kidney disease, chronic, stage IV (GFR 15-29 ml/min), Limb alert care status, MI (mitral incompetence), Myocardial infarction, Nocturia, Peritoneal dialysis status, Pleural effusion, Pneumonia, Pneumothorax, Shingles, Sleep disturbance, Thyroid disease, Ureteral reflux, Urinary retention, and UTI (urinary tract infection).    Surgical History:  has a past surgical history that includes Vasectomy; Carpal tunnel release; wisdom teeth extracted; Coronary angioplasty with stent (03/25/2015); Urethra surgery; Cardiac catheterization; Cystoscopy; AV fistula placement (Left); Prostate surgery; Colonoscopy (N/A, 2/16/2017); fatty tissue removal (Left); hemodialysis; Left heart catheterization (Left, 11/6/2019); Laparoscopic insertion of peritoneal dialysis catheter; Esophagogastroduodenoscopy (N/A, 5/15/2020); and Upper gastrointestinal endoscopy.    Family History: family history includes Diabetes in his father; Hypertension in his father and mother; No Known Problems in his brother and sister; Suicide in his brother.. Otherwise  no colon cancer, inflammatory bowel disease, or GI malignancies.    Social History:  reports that he quit smoking about 5 years ago. He has a 5.00 pack-year smoking history. He quit smokeless tobacco use about 5 years ago. He reports that he does not drink alcohol or use drugs.    Review of patient's allergies indicates:   Allergen Reactions    Adhesive Other (See Comments)     No paper tape    Codeine Nausea And Vomiting and Rash       Medications:   Medications Prior to Admission   Medication Sig Dispense Refill Last Dose    acebutoloL (SECTRAL) 200 MG capsule Take 200 mg by mouth once daily.       aspirin (ECOTRIN) 81 MG EC tablet Take 81 mg by mouth.       atorvastatin (LIPITOR) 40 MG tablet Take 20 mg by mouth once daily.       ergocalciferol (ERGOCALCIFEROL) 50,000 unit Cap Take 50,000 Units by mouth.       fluticasone (FLONASE) 50 mcg/actuation nasal spray 1 spray by Nasal route 2 (two) times daily.        furosemide (LASIX) 80 MG tablet Take 80 mg by mouth as needed. Take 40mg - 80mg daily as needed       gentamicin (GARAMYCIN) 0.1 % cream Apply topically as needed.       lisinopril 10 MG tablet Take 5 mg by mouth once daily.        omeprazole (PRILOSEC) 40 MG capsule Take 40 mg by mouth 2 (two) times daily.        sodium bicarbonate 650 MG tablet Take 650 mg by mouth 3 (three) times daily. 3 TABLETS       tamsulosin (FLOMAX) 0.4 mg Cp24 Take 0.4 mg by mouth once daily.       temazepam (RESTORIL) 7.5 MG Cap Take 15 mg by mouth nightly as needed.       terbinafine HCL (LAMISIL) 250 mg tablet Take 250 mg by mouth once daily.       tolnaftate (TINACTIN) 1 % external solution tolnaftate          Physical Exam:    Vital Signs: There were no vitals filed for this visit.    General Appearance: Well appearing in no acute distress  Eyes:    No scleral icterus  Lungs: CTA anteriorly  Heart:  Regular rate, S1, S2 normal, no murmurs heard.  Abdomen: Soft, non tender, non distended with normal bowel  sounds. No hepatosplenomegaly, ascites, or mass.  Extremities: No edema  Skin: No rash    Labs:  Lab Results   Component Value Date    WBC 9.40 04/30/2018    HGB 16.2 04/30/2018    HCT 48.5 04/30/2018     04/30/2018    CHOL 165 07/27/2016    TRIG 83 07/27/2016    HDL 43 07/27/2016    ALT 28 02/20/2017    AST 22 02/20/2017     05/15/2020    K 4.6 05/15/2020     05/15/2020    CREATININE 3.40 (H) 05/15/2020    BUN 41 (H) 05/15/2020    CO2 23 05/15/2020    TSH 2.456 09/14/2016    PSA 1.3 07/27/2016    INR 1.0 07/27/2016       I have explained the risks and benefits of endoscopy procedures to the patient including but not limited to bleeding, perforation, infection, and death.      Gloria Peraza MD

## 2020-12-10 NOTE — TRANSFER OF CARE
"Anesthesia Transfer of Care Note    Patient: Reinier Martin    Procedure(s) Performed: Procedure(s) (LRB):  ESOPHAGOGASTRODUODENOSCOPY (EGD) with BRAVO (N/A)  PH MONITORING, ESOPHAGUS, WIRELESS, (OFF REFLUX MEDS) (N/A)    Patient location: GI    Anesthesia Type: general    Transport from OR: Transported from OR on room air with adequate spontaneous ventilation    Post pain: adequate analgesia    Post assessment: no apparent anesthetic complications and tolerated procedure well    Post vital signs: stable    Level of consciousness: alert and awake    Nausea/Vomiting: no nausea/vomiting    Complications: none    Transfer of care protocol was followed      Last vitals:   Visit Vitals  BP (!) 99/50   Pulse 68   Temp 36.8 °C (98.2 °F)   Resp 20   Ht 5' 11" (1.803 m)   Wt 118 kg (260 lb 2.3 oz)   SpO2 (!) 94%   BMI 36.28 kg/m²     "

## 2020-12-14 LAB — GASTRIN SERPL-MCNC: 57 PG/ML

## 2020-12-15 ENCOUNTER — PATIENT MESSAGE (OUTPATIENT)
Dept: GASTROENTEROLOGY | Facility: CLINIC | Age: 45
End: 2020-12-15

## 2020-12-16 NOTE — TELEPHONE ENCOUNTER
Per pts msg    I met with my neurologist today, he had an explanation with a case study about what is possibly  happening with me. He believes your treatment plan will resolve the issue.  He provided a reference if you would like to check it out.      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 LPR is a common disorder in persons older than 40 yr and is characterized by acid reflux into the upper esophagus and pharynx. Classic symptoms include mucus production, cough, wheezing, hoarseness, throat irritation, and globus sensation.1-5 LPR is clinically differentiated from gastroesophageal reflux disease by the absence of heartburn and presence of prominent laryngopharyngeal symptoms, with laryngoscopy typically showing vocal cord edema and erythema.6 LPR is usually confirmed via pH studies demonstrating prolonged acidic pH in the upper esophagus and/or pharynx.2,7    Vagal and/or glossopharyngeal neuropathies, however, are relatively rare conditions that may present together.8 Glossopharyngeal neuropathy is characterized by paroxysms of lancinating or burning pain in the oropharynx, whereas vagal neuropathy presents similarly but can also include symptoms of vocal cord dysfunction, such as hoarseness. Electromyography can be performed to confirm the diagnosis9 but is uncomfortable to the point of requiring deep sedation and is thus rarely done.    Both conditions share some symptoms with LPR, notably inspiratory stridor, hoarseness, and throat pain.10 In our case, the patients neuropathy was first diagnosed at the chronic pain center and  successfully treated with pregabalin almost a year after its onset.

## 2020-12-18 LAB
FINAL PATHOLOGIC DIAGNOSIS: NORMAL
GROSS: NORMAL
Lab: NORMAL

## 2020-12-29 ENCOUNTER — TELEPHONE (OUTPATIENT)
Dept: GASTROENTEROLOGY | Facility: CLINIC | Age: 45
End: 2020-12-29

## 2020-12-29 DIAGNOSIS — K20.90 ESOPHAGITIS: Primary | ICD-10-CM

## 2020-12-29 NOTE — TELEPHONE ENCOUNTER
Please let patient know that his gastrin level is normal, which makes it unlikely that he has a tumor that produces gastrin which would then be elevating his acid level.      He will need repeat EGD in 12 weeks from last 1 on high-dose PPI.  Please arrange    [facility] PROCEDURE ORDERS    CLEARANCE FOR PROCEDURES:  No needed     PROCEDURES  EGD     FLOOR:  4th Floor     PREP  Standard Prep    MEDICATIONS    ON PPI/H2 Blocker

## 2021-01-04 ENCOUNTER — OFFICE VISIT (OUTPATIENT)
Dept: GASTROENTEROLOGY | Facility: CLINIC | Age: 46
End: 2021-01-04
Payer: MEDICARE

## 2021-01-04 ENCOUNTER — TELEPHONE (OUTPATIENT)
Dept: GASTROENTEROLOGY | Facility: CLINIC | Age: 46
End: 2021-01-04

## 2021-01-04 ENCOUNTER — TELEPHONE (OUTPATIENT)
Dept: SLEEP MEDICINE | Facility: CLINIC | Age: 46
End: 2021-01-04

## 2021-01-04 DIAGNOSIS — K21.00 GASTROESOPHAGEAL REFLUX DISEASE WITH ESOPHAGITIS, UNSPECIFIED WHETHER HEMORRHAGE: ICD-10-CM

## 2021-01-04 DIAGNOSIS — R07.89 NON-CARDIAC CHEST PAIN: ICD-10-CM

## 2021-01-04 DIAGNOSIS — Z88.9 HISTORY OF SEASONAL ALLERGIES: ICD-10-CM

## 2021-01-04 DIAGNOSIS — M62.838 MUSCLE SPASM: ICD-10-CM

## 2021-01-04 DIAGNOSIS — D64.9 ANEMIA, UNSPECIFIED TYPE: Primary | ICD-10-CM

## 2021-01-04 DIAGNOSIS — G72.9 MYOPATHY, UNSPECIFIED: ICD-10-CM

## 2021-01-04 DIAGNOSIS — K21.9 GASTROESOPHAGEAL REFLUX DISEASE, UNSPECIFIED WHETHER ESOPHAGITIS PRESENT: ICD-10-CM

## 2021-01-04 DIAGNOSIS — G62.9 NEUROPATHY: ICD-10-CM

## 2021-01-04 PROCEDURE — 99215 OFFICE O/P EST HI 40 MIN: CPT | Mod: 95,,, | Performed by: INTERNAL MEDICINE

## 2021-01-04 PROCEDURE — 99215 PR OFFICE/OUTPT VISIT, EST, LEVL V, 40-54 MIN: ICD-10-PCS | Mod: 95,,, | Performed by: INTERNAL MEDICINE

## 2021-01-04 RX ORDER — FAMOTIDINE 40 MG/1
40 TABLET, FILM COATED ORAL 2 TIMES DAILY
Qty: 60 TABLET | Refills: 11 | Status: SHIPPED | OUTPATIENT
Start: 2021-01-04 | End: 2022-01-04

## 2021-01-05 ENCOUNTER — TELEPHONE (OUTPATIENT)
Dept: GASTROENTEROLOGY | Facility: CLINIC | Age: 46
End: 2021-01-05

## 2021-01-05 ENCOUNTER — PATIENT MESSAGE (OUTPATIENT)
Dept: GASTROENTEROLOGY | Facility: CLINIC | Age: 46
End: 2021-01-05

## 2021-01-13 ENCOUNTER — TELEPHONE (OUTPATIENT)
Dept: ENDOSCOPY | Facility: HOSPITAL | Age: 46
End: 2021-01-13

## 2021-01-15 ENCOUNTER — TELEPHONE (OUTPATIENT)
Dept: ENDOSCOPY | Facility: HOSPITAL | Age: 46
End: 2021-01-15

## 2021-01-15 ENCOUNTER — PATIENT MESSAGE (OUTPATIENT)
Dept: ENDOSCOPY | Facility: HOSPITAL | Age: 46
End: 2021-01-15

## 2021-01-29 ENCOUNTER — TELEPHONE (OUTPATIENT)
Dept: ENDOSCOPY | Facility: HOSPITAL | Age: 46
End: 2021-01-29

## 2021-02-02 ENCOUNTER — TELEPHONE (OUTPATIENT)
Dept: ENDOSCOPY | Facility: HOSPITAL | Age: 46
End: 2021-02-02

## 2021-03-08 ENCOUNTER — TELEPHONE (OUTPATIENT)
Dept: GASTROENTEROLOGY | Facility: CLINIC | Age: 46
End: 2021-03-08

## 2021-04-10 ENCOUNTER — PATIENT MESSAGE (OUTPATIENT)
Dept: ENDOSCOPY | Facility: HOSPITAL | Age: 46
End: 2021-04-10

## 2021-05-04 ENCOUNTER — PATIENT MESSAGE (OUTPATIENT)
Dept: RESEARCH | Facility: HOSPITAL | Age: 46
End: 2021-05-04

## 2022-03-15 ENCOUNTER — TELEPHONE (OUTPATIENT)
Dept: GASTROENTEROLOGY | Facility: CLINIC | Age: 47
End: 2022-03-15
Payer: MEDICARE

## 2022-03-15 NOTE — TELEPHONE ENCOUNTER
Attempted to contact patient regarding a follow up appointment with .  Phone number listed Is no longer in service.  Will attempt by mailing an appointment to patient.   Randi

## 2022-04-12 ENCOUNTER — PATIENT MESSAGE (OUTPATIENT)
Dept: GASTROENTEROLOGY | Facility: CLINIC | Age: 47
End: 2022-04-12
Payer: MEDICARE

## 2022-04-12 ENCOUNTER — TELEPHONE (OUTPATIENT)
Dept: GASTROENTEROLOGY | Facility: CLINIC | Age: 47
End: 2022-04-12
Payer: MEDICARE